# Patient Record
Sex: MALE | Race: WHITE | NOT HISPANIC OR LATINO
[De-identification: names, ages, dates, MRNs, and addresses within clinical notes are randomized per-mention and may not be internally consistent; named-entity substitution may affect disease eponyms.]

---

## 2018-05-15 ENCOUNTER — APPOINTMENT (OUTPATIENT)
Dept: CARDIOLOGY | Facility: CLINIC | Age: 58
End: 2018-05-15
Payer: COMMERCIAL

## 2018-05-15 VITALS
SYSTOLIC BLOOD PRESSURE: 138 MMHG | BODY MASS INDEX: 26.37 KG/M2 | HEART RATE: 76 BPM | WEIGHT: 168 LBS | DIASTOLIC BLOOD PRESSURE: 80 MMHG | HEIGHT: 67 IN

## 2018-05-15 DIAGNOSIS — Z98.890 OTHER SPECIFIED POSTPROCEDURAL STATES: ICD-10-CM

## 2018-05-15 DIAGNOSIS — Z78.9 OTHER SPECIFIED HEALTH STATUS: ICD-10-CM

## 2018-05-15 DIAGNOSIS — Z82.49 FAMILY HISTORY OF ISCHEMIC HEART DISEASE AND OTHER DISEASES OF THE CIRCULATORY SYSTEM: ICD-10-CM

## 2018-05-15 PROCEDURE — 99205 OFFICE O/P NEW HI 60 MIN: CPT

## 2018-07-31 ENCOUNTER — APPOINTMENT (OUTPATIENT)
Dept: CARDIOLOGY | Facility: CLINIC | Age: 58
End: 2018-07-31
Payer: COMMERCIAL

## 2018-07-31 VITALS
HEIGHT: 67 IN | WEIGHT: 168 LBS | HEART RATE: 76 BPM | SYSTOLIC BLOOD PRESSURE: 140 MMHG | DIASTOLIC BLOOD PRESSURE: 70 MMHG | BODY MASS INDEX: 26.37 KG/M2

## 2018-07-31 PROCEDURE — 99215 OFFICE O/P EST HI 40 MIN: CPT

## 2018-07-31 RX ORDER — TICAGRELOR 90 MG/1
90 TABLET ORAL TWICE DAILY
Qty: 180 | Refills: 3 | Status: DISCONTINUED | COMMUNITY
End: 2018-07-31

## 2019-01-15 ENCOUNTER — APPOINTMENT (OUTPATIENT)
Dept: CARDIOLOGY | Facility: CLINIC | Age: 59
End: 2019-01-15
Payer: COMMERCIAL

## 2019-01-15 ENCOUNTER — NON-APPOINTMENT (OUTPATIENT)
Age: 59
End: 2019-01-15

## 2019-01-15 VITALS
HEART RATE: 84 BPM | DIASTOLIC BLOOD PRESSURE: 68 MMHG | WEIGHT: 168 LBS | BODY MASS INDEX: 26.37 KG/M2 | HEIGHT: 67 IN | SYSTOLIC BLOOD PRESSURE: 128 MMHG | OXYGEN SATURATION: 98 %

## 2019-01-15 DIAGNOSIS — Z00.00 ENCOUNTER FOR GENERAL ADULT MEDICAL EXAMINATION W/OUT ABNORMAL FINDINGS: ICD-10-CM

## 2019-01-15 PROCEDURE — 93000 ELECTROCARDIOGRAM COMPLETE: CPT

## 2019-01-15 PROCEDURE — 99215 OFFICE O/P EST HI 40 MIN: CPT

## 2019-01-15 NOTE — PHYSICAL EXAM
[General Appearance - Well Developed] : well developed [Normal Appearance] : normal appearance [Well Groomed] : well groomed [General Appearance - Well Nourished] : well nourished [No Deformities] : no deformities [General Appearance - In No Acute Distress] : no acute distress [Normal Conjunctiva] : the conjunctiva exhibited no abnormalities [Eyelids - No Xanthelasma] : the eyelids demonstrated no xanthelasmas [Normal Oral Mucosa] : normal oral mucosa [No Oral Pallor] : no oral pallor [No Oral Cyanosis] : no oral cyanosis [Normal Jugular Venous A Waves Present] : normal jugular venous A waves present [Normal Jugular Venous V Waves Present] : normal jugular venous V waves present [No Jugular Venous Darnell A Waves] : no jugular venous darnell A waves [Respiration, Rhythm And Depth] : normal respiratory rhythm and effort [Exaggerated Use Of Accessory Muscles For Inspiration] : no accessory muscle use [Auscultation Breath Sounds / Voice Sounds] : lungs were clear to auscultation bilaterally [Heart Rate And Rhythm] : heart rate and rhythm were normal [Heart Sounds] : normal S1 and S2 [Murmurs] : no murmurs present [Abdomen Soft] : soft [Abdomen Tenderness] : non-tender [Abdomen Mass (___ Cm)] : no abdominal mass palpated [Abnormal Walk] : normal gait [Gait - Sufficient For Exercise Testing] : the gait was sufficient for exercise testing [Nail Clubbing] : no clubbing of the fingernails [Cyanosis, Localized] : no localized cyanosis [Petechial Hemorrhages (___cm)] : no petechial hemorrhages [Skin Color & Pigmentation] : normal skin color and pigmentation [] : no rash [No Venous Stasis] : no venous stasis [Skin Lesions] : no skin lesions [No Skin Ulcers] : no skin ulcer [No Xanthoma] : no  xanthoma was observed [Oriented To Time, Place, And Person] : oriented to person, place, and time [Affect] : the affect was normal [Mood] : the mood was normal [No Anxiety] : not feeling anxious

## 2019-01-15 NOTE — DISCUSSION/SUMMARY
[FreeTextEntry1] : He will remain on all of the medications, and I added Zetia 10mg and switched from Brilinta to plavix.\par His EKG was normal today\par I will see him again in 6 months

## 2019-01-15 NOTE — REASON FOR VISIT
[Follow-Up - Clinic] : a clinic follow-up of [Coronary Artery Disease] : coronary artery disease [Hyperlipidemia] : hyperlipidemia [Hypertension] : hypertension [FreeTextEntry1] : I saw this 58-year-old man in f/u cardiac consultation on 01/15/19\par He has had borderline hypertension and hyperlipidemia, stopped smoking 2 years ago, has a family history for coronary artery disease, was visiting in [place] 6 months ago and developed accelerated angina, angiography, and stent to a 95% proximal right coronary artery lesion. The ventricle was normal on echo, he had a 30-50% circumflex marginal lesion.\par He is on all the appropriate meds, has resumed his normal physical activity, and is asymptomatic.\par He reduced  his statin to 40 mg of Lipitor, but his most recent blood work was not satisfactory. I  added  Zetia 10mg.\par

## 2019-07-16 ENCOUNTER — APPOINTMENT (OUTPATIENT)
Dept: CARDIOLOGY | Facility: CLINIC | Age: 59
End: 2019-07-16

## 2022-01-23 ENCOUNTER — APPOINTMENT (OUTPATIENT)
Dept: DISASTER EMERGENCY | Facility: CLINIC | Age: 62
End: 2022-01-23

## 2022-01-26 ENCOUNTER — OUTPATIENT (OUTPATIENT)
Dept: OUTPATIENT SERVICES | Facility: HOSPITAL | Age: 62
LOS: 1 days | End: 2022-01-26

## 2022-02-01 DIAGNOSIS — Z12.11 ENCOUNTER FOR SCREENING FOR MALIGNANT NEOPLASM OF COLON: ICD-10-CM

## 2022-02-01 DIAGNOSIS — Z95.1 PRESENCE OF AORTOCORONARY BYPASS GRAFT: ICD-10-CM

## 2022-02-01 DIAGNOSIS — K64.9 UNSPECIFIED HEMORRHOIDS: ICD-10-CM

## 2022-02-01 DIAGNOSIS — E78.00 PURE HYPERCHOLESTEROLEMIA, UNSPECIFIED: ICD-10-CM

## 2022-02-24 ENCOUNTER — TRANSCRIPTION ENCOUNTER (OUTPATIENT)
Age: 62
End: 2022-02-24

## 2022-05-11 ENCOUNTER — NON-APPOINTMENT (OUTPATIENT)
Age: 62
End: 2022-05-11

## 2022-05-11 ENCOUNTER — APPOINTMENT (OUTPATIENT)
Dept: CARDIOLOGY | Facility: CLINIC | Age: 62
End: 2022-05-11
Payer: MEDICAID

## 2022-05-11 VITALS
TEMPERATURE: 98.2 F | BODY MASS INDEX: 27.94 KG/M2 | SYSTOLIC BLOOD PRESSURE: 122 MMHG | OXYGEN SATURATION: 98 % | HEIGHT: 67 IN | DIASTOLIC BLOOD PRESSURE: 64 MMHG | WEIGHT: 178 LBS | HEART RATE: 79 BPM

## 2022-05-11 DIAGNOSIS — Z63.5 DISRUPTION OF FAMILY BY SEPARATION AND DIVORCE: ICD-10-CM

## 2022-05-11 DIAGNOSIS — Z82.49 FAMILY HISTORY OF ISCHEMIC HEART DISEASE AND OTHER DISEASES OF THE CIRCULATORY SYSTEM: ICD-10-CM

## 2022-05-11 PROCEDURE — 99204 OFFICE O/P NEW MOD 45 MIN: CPT

## 2022-05-11 PROCEDURE — 93000 ELECTROCARDIOGRAM COMPLETE: CPT

## 2022-05-11 RX ORDER — CLOPIDOGREL BISULFATE 75 MG/1
75 TABLET, FILM COATED ORAL DAILY
Qty: 90 | Refills: 3 | Status: DISCONTINUED | COMMUNITY
Start: 2018-07-31 | End: 2022-05-11

## 2022-05-11 RX ORDER — ATORVASTATIN CALCIUM 40 MG/1
40 TABLET, FILM COATED ORAL
Qty: 90 | Refills: 3 | Status: DISCONTINUED | COMMUNITY
End: 2022-05-11

## 2022-05-11 RX ORDER — NEBIVOLOL HYDROCHLORIDE 10 MG/1
10 TABLET ORAL DAILY
Refills: 0 | Status: DISCONTINUED | COMMUNITY
End: 2022-05-11

## 2022-05-11 RX ORDER — EZETIMIBE 10 MG/1
10 TABLET ORAL DAILY
Refills: 0 | Status: DISCONTINUED | COMMUNITY
End: 2022-05-11

## 2022-05-11 RX ORDER — ATORVASTATIN CALCIUM 80 MG/1
80 TABLET, FILM COATED ORAL DAILY
Refills: 0 | Status: DISCONTINUED | COMMUNITY
End: 2022-05-11

## 2022-05-11 SDOH — SOCIAL STABILITY - SOCIAL INSECURITY: DISRUPTION OF FAMILY BY SEPARATION AND DIVORCE: Z63.5

## 2022-05-15 NOTE — DISCUSSION/SUMMARY
[FreeTextEntry1] : \par # Atypical CP with CAD prior PCI 2018 prox RCA:\par - updated TTE\par - ETT for ischemia, heart rate response, BP response, arrhythmia, functional status, oxygen\par -Aspirin/statin compliance encouraged.\par \par # HTN, HLD, CKD, former tobacco: LDL not at goal off statin. BP almost at goal.\par -Strongly encouraged compliance with atorvastatin 20 and repeat lab work in 3 months with goal LDL less than 70\par -Continue current blood pressure regimen bisoprolol 5\par -Aspirin monotherapy\par -Given former tobacco ordered abdominal aorta ultrasound rule out aneurysm and carotid Doppler\par \par # CKD prior left nephrectomy: Creatinine 1.43.  Follow closely with PCP consider nephrology.  Monitor closely I will order repeat lab work.\par \par \par Follow-up after testing.  ER precautions given to patient.

## 2022-05-15 NOTE — PHYSICAL EXAM
[Well Developed] : well developed [Well Nourished] : well nourished [Normal Venous Pressure] : normal venous pressure [Normal S1, S2] : normal S1, S2 [Clear Lung Fields] : clear lung fields

## 2022-05-15 NOTE — HISTORY OF PRESENT ILLNESS
[FreeTextEntry1] : \par 62 yo M\par CAD prior PCI 2018 prox RCA\par Hypertension\par Hyperlipidemia\par CKD prior left nephrectomy\par Former tobacco use\par \par 5/2022 VISIT: Has atypical chest pain, sharp and nonexertional.  Blood pressure optimized.  No alarm signs or symptoms. last seen 2019.  Was not taking his atorvastatin.  .\par \par \par TESTING I REVIEWED TODAY:\par 5/2022 EKG: NSR LVH\par \par 2/2022 LABWORK: Grossly normal CBC.  Creatinine 1.43.  .  Total cholesterol 187.  HDL 51.  Normal CK and TFT.\par \par 2018 95% proximal right coronary artery lesion. The ventricle was normal on echo, he had a 30-50% circumflex marginal lesion.\par \par \par \par

## 2022-05-25 ENCOUNTER — APPOINTMENT (OUTPATIENT)
Dept: CARDIOLOGY | Facility: CLINIC | Age: 62
End: 2022-05-25
Payer: MEDICAID

## 2022-05-25 PROCEDURE — 93880 EXTRACRANIAL BILAT STUDY: CPT

## 2022-05-25 PROCEDURE — 93306 TTE W/DOPPLER COMPLETE: CPT

## 2022-05-25 PROCEDURE — 93979 VASCULAR STUDY: CPT

## 2022-05-31 ENCOUNTER — APPOINTMENT (OUTPATIENT)
Dept: CARDIOLOGY | Facility: CLINIC | Age: 62
End: 2022-05-31
Payer: MEDICAID

## 2022-05-31 PROCEDURE — 93015 CV STRESS TEST SUPVJ I&R: CPT

## 2022-06-07 ENCOUNTER — APPOINTMENT (OUTPATIENT)
Dept: CARDIOLOGY | Facility: CLINIC | Age: 62
End: 2022-06-07
Payer: MEDICAID

## 2022-06-07 PROCEDURE — 78452 HT MUSCLE IMAGE SPECT MULT: CPT

## 2022-06-07 PROCEDURE — A9502: CPT

## 2022-06-07 PROCEDURE — 93015 CV STRESS TEST SUPVJ I&R: CPT

## 2022-06-08 ENCOUNTER — APPOINTMENT (OUTPATIENT)
Dept: CARDIOLOGY | Facility: CLINIC | Age: 62
End: 2022-06-08
Payer: MEDICAID

## 2022-06-08 VITALS
SYSTOLIC BLOOD PRESSURE: 122 MMHG | OXYGEN SATURATION: 98 % | DIASTOLIC BLOOD PRESSURE: 70 MMHG | WEIGHT: 175 LBS | BODY MASS INDEX: 27.47 KG/M2 | HEIGHT: 67 IN | HEART RATE: 79 BPM | RESPIRATION RATE: 18 BRPM | TEMPERATURE: 97.1 F

## 2022-06-08 PROCEDURE — 99214 OFFICE O/P EST MOD 30 MIN: CPT

## 2022-06-08 NOTE — HISTORY OF PRESENT ILLNESS
[FreeTextEntry1] : \par 60 yo M\par CAD prior PCI 2018 prox RCA\par Hypertension\par Hyperlipidemia\par CKD prior left nephrectomy\par Former tobacco use\par \par \par 6/2022 VISIT: testing performed. no further symptoms. increasing his activity trying.\par \par 5/2022 VISIT: Has atypical chest pain, sharp and nonexertional.  Blood pressure optimized.  No alarm signs or symptoms. last seen 2019.  Was not taking his atorvastatin.  .\par \par \par TESTING I REVIEWED TODAY:\par 6/2022 EKG: NSR LVH\par NST: 12 METS, no ischemia by perfusion.\par No AAA\par No obstructive carotid disease\par Echocardiogram with preserved chamber sizes and biventricular heart function\par \par 2/2022 LABWORK: Grossly normal CBC.  Creatinine 1.43.  .  Total cholesterol 187.  HDL 51.  Normal CK and TFT.\par \par 2018 95% proximal right coronary artery lesion. The ventricle was normal on echo, he had a 30-50% circumflex marginal lesion.\par \par \par \par

## 2022-06-08 NOTE — DISCUSSION/SUMMARY
[FreeTextEntry1] : \par # Atypical CP with CAD prior PCI 2018 prox RCA: No perfusion defect on nuclear stress test.  Preserved functional status 12 METS.\par -Aspirin/statin compliance encouraged.\par \par # HTN, HLD, CKD, former tobacco: LDL was not at goal off statin. BP almost at goal.\par -Strongly encouraged compliance with atorvastatin 20 and repeat lab work in 3 months with goal LDL less than 70\par -Continue current blood pressure regimen bisoprolol 5\par -Aspirin monotherapy\par \par # CKD prior left nephrectomy: Creatinine 1.43.  Follow closely with PCP consider nephrology.  Monitor closely I will order repeat lab work in 3 months.\par \par \par Follow-up in 6 months.  Lab work interim. ER precautions given to patient.

## 2022-11-30 ENCOUNTER — APPOINTMENT (OUTPATIENT)
Dept: CARDIOLOGY | Facility: CLINIC | Age: 62
End: 2022-11-30

## 2022-11-30 VITALS
DIASTOLIC BLOOD PRESSURE: 60 MMHG | BODY MASS INDEX: 27.78 KG/M2 | SYSTOLIC BLOOD PRESSURE: 120 MMHG | TEMPERATURE: 97.8 F | HEART RATE: 76 BPM | HEIGHT: 67 IN | WEIGHT: 177 LBS | OXYGEN SATURATION: 98 %

## 2022-11-30 PROCEDURE — 99214 OFFICE O/P EST MOD 30 MIN: CPT

## 2022-11-30 NOTE — REASON FOR VISIT
[Symptom and Test Evaluation] : symptom and test evaluation [CV Risk Factors and Non-Cardiac Disease] : CV risk factors and non-cardiac disease [Hyperlipidemia] : hyperlipidemia [Coronary Artery Disease] : coronary artery disease

## 2022-11-30 NOTE — HISTORY OF PRESENT ILLNESS
[FreeTextEntry1] : \par 63 yo M\par CAD prior PCI 2018 prox RCA\par Hypertension\par Hyperlipidemia\par CKD prior left nephrectomy\par Former tobacco use\par \par 11/2022 VISIT: was taking atorva 20 reports LDL above 70 in august. no angina or dyspnea. moved houses so doing less activity but now increasing it. \par \par 6/2022 VISIT: testing performed. no further symptoms. increasing his activity trying.\par \par 5/2022 VISIT: Has atypical chest pain, sharp and nonexertional.  Blood pressure optimized.  No alarm signs or symptoms. last seen 2019.  Was not taking his atorvastatin.  .\par \par \par TESTING I REVIEWED TODAY:\par \par 6/2022 EKG: NSR LVH\par NST: 12 METS, no ischemia by perfusion.\par No AAA\par No obstructive carotid disease\par Echocardiogram with preserved chamber sizes and biventricular heart function\par \par 2/2022 LABWORK: Grossly normal CBC.  Creatinine 1.43.  .  Total cholesterol 187.  HDL 51.  Normal CK and TFT.\par \par 2018 95% proximal right coronary artery lesion. The ventricle was normal on echo, he had a 30-50% circumflex marginal lesion.\par

## 2023-05-07 ENCOUNTER — RESULT CHARGE (OUTPATIENT)
Age: 63
End: 2023-05-07

## 2023-05-08 ENCOUNTER — APPOINTMENT (OUTPATIENT)
Dept: CARDIOLOGY | Facility: CLINIC | Age: 63
End: 2023-05-08
Payer: MEDICAID

## 2023-05-08 ENCOUNTER — NON-APPOINTMENT (OUTPATIENT)
Age: 63
End: 2023-05-08

## 2023-05-08 VITALS
WEIGHT: 177 LBS | HEART RATE: 64 BPM | OXYGEN SATURATION: 96 % | BODY MASS INDEX: 27.78 KG/M2 | DIASTOLIC BLOOD PRESSURE: 80 MMHG | HEIGHT: 67 IN | SYSTOLIC BLOOD PRESSURE: 154 MMHG

## 2023-05-08 PROCEDURE — 99214 OFFICE O/P EST MOD 30 MIN: CPT | Mod: 25

## 2023-05-08 PROCEDURE — 93000 ELECTROCARDIOGRAM COMPLETE: CPT

## 2023-05-08 NOTE — HISTORY OF PRESENT ILLNESS
[FreeTextEntry1] : \par 61 yo M\par CAD prior PCI 2018 prox RCA\par Hypertension\par Hyperlipidemia\par CKD prior left nephrectomy\par Former tobacco use\par predm2\par \par 5/2023 VISIT: just increased atorvastatin to 40 back in 2/2023, prior was 10 and 20. no angina or dyspnea. active. 3 miles a day. labs 2/2023 ldl 104. normal lft/ck/cbc/tsh/psa. a1c 5.8. bun 20/cr 1.28. \par \par 11/2022 VISIT: was taking atorva 20 reports LDL above 70 in august. no angina or dyspnea. moved houses so doing less activity but now increasing it. \par \par 6/2022 VISIT: testing performed. no further symptoms. increasing his activity trying.\par \par 5/2022 VISIT: Has atypical chest pain, sharp and nonexertional.  Blood pressure optimized.  No alarm signs or symptoms. last seen 2019.  Was not taking his atorvastatin.  .\par \par \par TESTING I REVIEWED TODAY:\par \par 6/2022 EKG: NSR LVH\par NST: 12 METS, no ischemia by perfusion.\par No AAA\par No obstructive carotid disease\par Echocardiogram with preserved chamber sizes and biventricular heart function\par \par 2/2022 LABWORK: Grossly normal CBC.  Creatinine 1.43.  .  Total cholesterol 187.  HDL 51.  Normal CK and TFT.\par \par 2018 95% proximal right coronary artery lesion. The ventricle was normal on echo, he had a 30-50% circumflex marginal lesion.\par

## 2023-05-08 NOTE — DISCUSSION/SUMMARY
[FreeTextEntry1] : \par BP uncontrolled here: i rechecked myself. keep same regimen for now but if it's high at home then he will call and we can switch beta blocker to one with better BP control. \par \par # CAD prior PCI 2018 prox RCA: No perfusion defect on nuclear stress test.  Preserved functional status 12 METS.\par -Aspirin/statin compliance encouraged. atorva was increased 40 in 2/2023, goal ldl is <70. serial labwork pending 8/2023.\par \par # HTN, HLD, CKD, former tobacco: LDL was not at goal. bp elevated today. \par - atorva was increased 40, goal ldl is <70. serial labwork pending 8/2023.\par - BP uncontrolled here: i rechecked myself. keep same regimen for now but if it's high at home then he will call and we can switch beta blocker to one with better BP control. \par - Aspirin monotherapy\par \par # CKD prior left nephrectomy: Creatinine 1.28.  Follow closely with PCP consider nephrology.  Monitor closely\par \par Follow-up in 6 months.  Lab work and bp log in interim. ER precautions given to patient.\par

## 2023-11-08 ENCOUNTER — APPOINTMENT (OUTPATIENT)
Dept: CARDIOLOGY | Facility: CLINIC | Age: 63
End: 2023-11-08
Payer: MEDICARE

## 2023-11-08 VITALS
SYSTOLIC BLOOD PRESSURE: 114 MMHG | HEART RATE: 77 BPM | WEIGHT: 180 LBS | DIASTOLIC BLOOD PRESSURE: 70 MMHG | HEIGHT: 67 IN | OXYGEN SATURATION: 98 % | BODY MASS INDEX: 28.25 KG/M2

## 2023-11-08 DIAGNOSIS — R07.89 OTHER CHEST PAIN: ICD-10-CM

## 2023-11-08 PROCEDURE — 99215 OFFICE O/P EST HI 40 MIN: CPT

## 2023-11-08 RX ORDER — ASPIRIN ENTERIC COATED TABLETS 81 MG 81 MG/1
81 TABLET, DELAYED RELEASE ORAL DAILY
Qty: 90 | Refills: 3 | Status: ACTIVE | COMMUNITY
Start: 1900-01-01 | End: 1900-01-01

## 2023-11-08 RX ORDER — BISOPROLOL FUMARATE 5 MG/1
5 TABLET, FILM COATED ORAL
Qty: 90 | Refills: 2 | Status: ACTIVE | COMMUNITY
Start: 1900-01-01 | End: 1900-01-01

## 2024-05-07 PROBLEM — E78.5 HYPERLIPIDEMIA: Status: ACTIVE | Noted: 2022-05-11

## 2024-05-07 PROBLEM — N18.9 CHRONIC KIDNEY DISEASE, UNSPECIFIED CKD STAGE: Status: ACTIVE | Noted: 2022-05-11

## 2024-05-07 PROBLEM — I10 HYPERTENSION: Status: ACTIVE | Noted: 2022-05-11

## 2024-05-07 PROBLEM — I25.10 CORONARY ARTERY DISEASE: Status: ACTIVE | Noted: 2018-05-15

## 2024-05-07 PROBLEM — Z87.891 FORMER SMOKER: Status: ACTIVE | Noted: 2018-05-15

## 2024-05-07 PROBLEM — Z95.5 S/P CORONARY ARTERY STENT PLACEMENT: Status: ACTIVE | Noted: 2018-05-15

## 2024-05-08 ENCOUNTER — NON-APPOINTMENT (OUTPATIENT)
Age: 64
End: 2024-05-08

## 2024-05-08 ENCOUNTER — APPOINTMENT (OUTPATIENT)
Dept: CARDIOLOGY | Facility: CLINIC | Age: 64
End: 2024-05-08
Payer: MEDICARE

## 2024-05-08 VITALS
WEIGHT: 179 LBS | DIASTOLIC BLOOD PRESSURE: 78 MMHG | SYSTOLIC BLOOD PRESSURE: 122 MMHG | OXYGEN SATURATION: 96 % | BODY MASS INDEX: 28.09 KG/M2 | HEART RATE: 66 BPM | HEIGHT: 67 IN

## 2024-05-08 DIAGNOSIS — Z87.891 PERSONAL HISTORY OF NICOTINE DEPENDENCE: ICD-10-CM

## 2024-05-08 DIAGNOSIS — I25.10 ATHEROSCLEROTIC HEART DISEASE OF NATIVE CORONARY ARTERY W/OUT ANGINA PECTORIS: ICD-10-CM

## 2024-05-08 DIAGNOSIS — Z95.5 PRESENCE OF CORONARY ANGIOPLASTY IMPLANT AND GRAFT: ICD-10-CM

## 2024-05-08 DIAGNOSIS — E78.5 HYPERLIPIDEMIA, UNSPECIFIED: ICD-10-CM

## 2024-05-08 DIAGNOSIS — I10 ESSENTIAL (PRIMARY) HYPERTENSION: ICD-10-CM

## 2024-05-08 DIAGNOSIS — N18.9 CHRONIC KIDNEY DISEASE, UNSPECIFIED: ICD-10-CM

## 2024-05-08 PROCEDURE — 99214 OFFICE O/P EST MOD 30 MIN: CPT

## 2024-05-08 PROCEDURE — 93000 ELECTROCARDIOGRAM COMPLETE: CPT

## 2024-05-08 RX ORDER — ROSUVASTATIN CALCIUM 40 MG/1
40 TABLET, FILM COATED ORAL
Qty: 90 | Refills: 2 | Status: ACTIVE | COMMUNITY
Start: 2024-05-08 | End: 1900-01-01

## 2024-05-08 RX ORDER — ATORVASTATIN CALCIUM 40 MG/1
40 TABLET, FILM COATED ORAL DAILY
Qty: 90 | Refills: 2 | Status: DISCONTINUED | COMMUNITY
Start: 1900-01-01 | End: 2024-05-08

## 2024-05-08 NOTE — HISTORY OF PRESENT ILLNESS
[FreeTextEntry1] : 62 yo M here for cv follow up. He has CAD prior PCI 2018 prox RCA, HTN, CKD prior left nephrectomy, former tobacco use, and prediabetes  5/2024 VSIT: no hospitalizations. no cv issues now. . cr 1.31. CK improved 137.   11/2023 VISIT: bp controlled.  Dietary noncompliance.  Gained weight. ck 312. LDL 87. no active cv symptoms.   5/2023 VISIT: just increased atorvastatin to 40 back in 2/2023, prior was 10 and 20. no angina or dyspnea. active. 3 miles a day. labs 2/2023 ldl 104. normal lft/ck/cbc/tsh/psa. a1c 5.8. bun 20/cr 1.28.   11/2022 VISIT: was taking atorva 20 reports LDL above 70 in august. no angina or dyspnea. moved houses so doing less activity but now increasing it.   6/2022 VISIT: testing performed. no further symptoms. increasing his activity trying.  5/2022 VISIT: Has atypical chest pain, sharp and nonexertional.  Blood pressure optimized.  No alarm signs or symptoms. last seen 2019.  Was not taking his atorvastatin.  .  **TESTING I REVIEWED TODAY: excluding above 6/2022 EKG: NSR LVH NST: 12 METS, no ischemia by perfusion. No AAA No obstructive carotid disease Echocardiogram with preserved chamber sizes and biventricular heart function  2/2022 LABWORK: Grossly normal CBC.  Creatinine 1.43.  .  Total cholesterol 187.  HDL 51.  Normal CK and TFT.  2018 95% proximal right coronary artery lesion. The ventricle was normal on echo, he had a 30-50% circumflex marginal lesion.

## 2024-05-08 NOTE — DISCUSSION/SUMMARY
[FreeTextEntry1] : 64 yo M here for cv follow up. He has CAD prior PCI 2018 prox RCA, HTN, CKD prior left nephrectomy, former tobacco use, and prediabetes  He has preserved functional status 12 METS and no perfusion defect on nuclear imaging.  One issue is elevated CK level and mildly elevated LDL since his goal is LDL<70. Best is to switch to rosuvastatin 40 for better potency LDL reduction and hopefully without any CK issue.    - Recheck lipid and CK in 6 weeks. Seeing PCP soon.  - Aspirin monotherapy  - BP fairly well controlled  Follow-up in 6 months. call with Lab work and bp log in interim. ER precautions given to patient.

## 2024-05-08 NOTE — REASON FOR VISIT
[Symptom and Test Evaluation] : symptom and test evaluation [CV Risk Factors and Non-Cardiac Disease] : CV risk factors and non-cardiac disease [Hyperlipidemia] : hyperlipidemia [Coronary Artery Disease] : coronary artery disease [FreeTextEntry3] : Dr. Nii Palafox

## 2024-11-12 ENCOUNTER — APPOINTMENT (OUTPATIENT)
Dept: CARDIOLOGY | Facility: CLINIC | Age: 64
End: 2024-11-12